# Patient Record
Sex: MALE
[De-identification: names, ages, dates, MRNs, and addresses within clinical notes are randomized per-mention and may not be internally consistent; named-entity substitution may affect disease eponyms.]

---

## 2019-06-17 ENCOUNTER — HOSPITAL ENCOUNTER (OUTPATIENT)
Dept: HOSPITAL 5 - CATHLABREC | Age: 54
Discharge: HOME | End: 2019-06-17
Attending: INTERNAL MEDICINE
Payer: COMMERCIAL

## 2019-06-17 VITALS — DIASTOLIC BLOOD PRESSURE: 69 MMHG | SYSTOLIC BLOOD PRESSURE: 120 MMHG

## 2019-06-17 DIAGNOSIS — Z95.1: ICD-10-CM

## 2019-06-17 DIAGNOSIS — Z88.6: ICD-10-CM

## 2019-06-17 DIAGNOSIS — J84.10: Primary | ICD-10-CM

## 2019-06-17 DIAGNOSIS — Z79.82: ICD-10-CM

## 2019-06-17 DIAGNOSIS — R94.39: ICD-10-CM

## 2019-06-17 DIAGNOSIS — Z79.899: ICD-10-CM

## 2019-06-17 DIAGNOSIS — I10: ICD-10-CM

## 2019-06-17 DIAGNOSIS — E78.00: ICD-10-CM

## 2019-06-17 DIAGNOSIS — Z82.49: ICD-10-CM

## 2019-06-17 PROCEDURE — 75574 CT ANGIO HRT W/3D IMAGE: CPT

## 2019-06-17 NOTE — CAT SCAN REPORT
LIMITED CT OF THE CHEST PER CT CORONARY ANGIOGRAPHY PROTOCOL:



History: Abnormal stress test.



Limited CT of the chest per CT coronary angiography protocol is

submitted.  The cardiac portion of the exam has been previously

interpreted by the Cardiologist.



The included portions of the lungs appear clear without evidence

for suspicious nodule, mass or infiltrate. A 1 cm calcified granuloma 

is noted in the posterior segment of the right lower lobe. The included 

pleural spaces are clear. No mediastinal or hilar adenopathy is 

evident.  Included upper abdomen and solid abdominal organs are grossly 

within normal limits.



IMPRESSION:

Calcified granuloma in the right lower lobe, otherwise, unremarkable 

limited CT of the lower chest.

## 2019-06-19 NOTE — CT CALCIUM SCORING REPORT
Coronary Calcium Score


Date of service: 06/19/19


Procedure: 


High-resolution computed tomographic imaging of the chest was performed 

on06/17/19 with particular attention paid to the coronary arteries.  Images from

the examination were analyzed for the presence and extent of coronary artery 

calcification, using coronary calcium quantification software.  The patient 

tolerated the procedure well and there were no complications.  The results of 

the coronary calcification analysis are provided below.  The patient scores are 

compared with published data related to scores for people of a similar age and 

the same gender.








- Findings


Left Anterior Descending Artery: 139.47


Left Circumflex(LCX): 68.35


Right Coronary Artery(RCA): 59.21


Total Agatson Score: 267.03


Percentile Ranking: >75


Findings: 


Cardiac CTA





Indication:chest pain





Informed consent obtained





Procedure:





The patient was brought to the cardiac ct laboratory  at [facility] in stable 

condition after a 4 hour fast. Heart rate was regulated by beta blockade. 

Sublingual ngt was administered.  A coronary calcium score was performed via the

Agatston method. Left ventricular function was assessed by the threshold based 

volumetric segmentation approach.  A separate radiology assessment of the non 

cardiac structures in the field of view will be provided.





Superior vena cava in the field of view appears normal





Inferior vena cava in the filed of view appears normal





Ascending aorta in the field of view appears normal





Descending aorta in the field of view appears normal





Pulmonary artery in the filed of view appears normal





Pulmonary veins enter the left atrium appropriately





Left ventricle appears normal





Right Ventricle appears normal





Left atrium appears normal





Left atrial appendage appears normal





Right atrium appears normal





Interventricular septum appears normal





Interatrial septum appears normal





Aortic valve appears normal





Mitral Valve appears normal





Intracardiac mass: none





Pericardial effusion: none





Coronary Angiography:





Dominance: left





Origins: normal





Left main: normal





Left anterior descending coronary  artery and diagonal branches: non obstructive

calcific and mixed plaque in proximal vessel





Circumflex coronary artery and obtuse marginal branches: non obstructive 

calcific plaque in proximal vessel





Right coronary artery: non obstructive calcific plaque in proximal vessel





lvef 61% normal wall motion





the procedure was tolerated well. there were no procedural complications